# Patient Record
Sex: MALE
[De-identification: names, ages, dates, MRNs, and addresses within clinical notes are randomized per-mention and may not be internally consistent; named-entity substitution may affect disease eponyms.]

---

## 2022-11-14 ENCOUNTER — APPOINTMENT (OUTPATIENT)
Dept: AFTER HOURS CARE | Facility: EMERGENCY ROOM | Age: 42
End: 2022-11-14

## 2022-11-14 DIAGNOSIS — U07.1 COVID-19: ICD-10-CM

## 2022-11-14 PROBLEM — Z00.00 ENCOUNTER FOR PREVENTIVE HEALTH EXAMINATION: Status: ACTIVE | Noted: 2022-11-14

## 2022-11-14 PROCEDURE — 99442: CPT | Mod: 95

## 2022-11-14 RX ORDER — NIRMATRELVIR AND RITONAVIR 300-100 MG
20 X 150 MG & KIT ORAL
Qty: 30 | Refills: 0 | Status: ACTIVE | COMMUNITY
Start: 2022-11-14 | End: 1900-01-01

## 2022-11-14 NOTE — PHYSICAL EXAM
[No Acute Distress] : no acute distress [Speech Grossly Normal] : speech grossly normal [Normal Affect] : the affect was normal [Alert and Oriented x3] : oriented to person, place, and time [Normal Mood] : the mood was normal [Normal Insight/Judgement] : insight and judgment were intact

## 2022-11-14 NOTE — PLAN
[FreeTextEntry1] : Long d/w patient re treatment options including watchful waiting. He is concerned that he has a  and wants\par to do everything to limit potential spread to family members. Understands paxlovid may be limited in effectiveness\par in this regard. Prefers Rx. Made aware of potential side effects. Will f/u c PCP. Quarantine precautions discussed at\par length. Encouraged sufficient PO

## 2022-11-14 NOTE — HISTORY OF PRESENT ILLNESS
[Home] : at home, [unfilled] , at the time of the visit. [Other Location: e.g. Home (Enter Location, City,State)___] : at [unfilled] [Verbal consent obtained from patient] : the patient, [unfilled] [FreeTextEntry8] : Telephonic encounter due to technical problems on clinican side.  Patient understands the limitations of telephonic visit.\par \par 42 y M fever, cough, sore throat, anosmia. COVID+ last night. Symptoms started 1 day ago. No SOB, CP,\par lightheadedness. Decreased PO. No NVD\par PMH -- denies\par PSH -- denies\par Chantix allergy\par No kidney/liver disease\par COVID vaccinated and boosted\par Currently quarantining away from with family

## 2023-11-09 ENCOUNTER — TRANSCRIPTION ENCOUNTER (OUTPATIENT)
Age: 43
End: 2023-11-09